# Patient Record
Sex: FEMALE | Race: WHITE | NOT HISPANIC OR LATINO | URBAN - METROPOLITAN AREA
[De-identification: names, ages, dates, MRNs, and addresses within clinical notes are randomized per-mention and may not be internally consistent; named-entity substitution may affect disease eponyms.]

---

## 2020-07-25 ENCOUNTER — TELEPHONE ENCOUNTER (OUTPATIENT)
Dept: URBAN - METROPOLITAN AREA CLINIC 13 | Facility: CLINIC | Age: 85
End: 2020-07-25

## 2020-07-25 RX ORDER — LEVOTHYROXINE SODIUM 0.1 MG/1
TAKE 1 TABLET DAILY TABLET ORAL
Refills: 0 | OUTPATIENT
End: 2015-10-22

## 2020-07-25 RX ORDER — TEMAZEPAM 15 MG/1
TAKE 1 CAPSULE AT BEDTIME AS NEEDED CAPSULE ORAL
Refills: 0 | OUTPATIENT
End: 2015-10-22

## 2020-07-26 ENCOUNTER — TELEPHONE ENCOUNTER (OUTPATIENT)
Dept: URBAN - METROPOLITAN AREA CLINIC 13 | Facility: CLINIC | Age: 85
End: 2020-07-26

## 2020-07-26 RX ORDER — RIVAROXABAN 20 MG/1
TAKE 1 TABLET DAILY TABLET, FILM COATED ORAL
Refills: 0 | Status: ACTIVE | COMMUNITY

## 2020-07-26 RX ORDER — DEXTRIN 3 G/3.5 G
TAKE AS DIRECTED POWDER (GRAM) ORAL
Refills: 0 | Status: ACTIVE | COMMUNITY

## 2020-07-26 RX ORDER — SIMVASTATIN 10 MG/1
TAKE 1 TABLET DAILY TABLET, FILM COATED ORAL
Refills: 0 | Status: ACTIVE | COMMUNITY

## 2020-07-26 RX ORDER — CALCIUM CITRATE/VITAMIN D3 315MG-6.25
TAKE 1 TABLET DAILY TABLET ORAL
Refills: 0 | Status: ACTIVE | COMMUNITY

## 2020-07-26 RX ORDER — METOPROLOL SUCCINATE 25 MG/1
TAKE TABLET  1/2 BID TABLET, EXTENDED RELEASE ORAL
Refills: 0 | Status: ACTIVE | COMMUNITY

## 2020-07-26 RX ORDER — OMEPRAZOLE 20 MG/1
TAKE 1 CAPSULE DAILY CAPSULE, DELAYED RELEASE ORAL
Refills: 0 | Status: ACTIVE | COMMUNITY

## 2020-07-26 RX ORDER — LEVOTHYROXINE SODIUM 0.09 MG/1
TAKE 1 TABLET DAILY TABLET ORAL
Refills: 0 | Status: ACTIVE | COMMUNITY

## 2020-07-26 RX ORDER — TEMAZEPAM 15 MG/1
TAKE 1 CAPSULE AT BEDTIME CAPSULE ORAL
Refills: 0 | Status: ACTIVE | COMMUNITY

## 2020-07-26 RX ORDER — LUBIPROSTONE 8 UG/1
TAKE 1 CAPSULE TWICE DAILY CAPSULE, GELATIN COATED ORAL
Refills: 0 | Status: ACTIVE | COMMUNITY

## 2022-09-21 ENCOUNTER — HOME HEALTH ADMISSION (OUTPATIENT)
Dept: HOME HEALTH SERVICES | Facility: HOME HEALTH | Age: 87
End: 2022-09-21
Payer: MEDICARE

## 2022-09-22 ENCOUNTER — HOME CARE VISIT (OUTPATIENT)
Dept: SCHEDULING | Facility: HOME HEALTH | Age: 87
End: 2022-09-22
Payer: MEDICARE

## 2022-09-22 VITALS
TEMPERATURE: 97.4 F | SYSTOLIC BLOOD PRESSURE: 120 MMHG | HEART RATE: 66 BPM | DIASTOLIC BLOOD PRESSURE: 78 MMHG | OXYGEN SATURATION: 97 % | RESPIRATION RATE: 18 BRPM

## 2022-09-22 PROCEDURE — 400013 HH SOC

## 2022-09-22 PROCEDURE — 1090000002 HH PPS REVENUE DEBIT

## 2022-09-22 PROCEDURE — 0221000100 HH NO PAY CLAIM PROCEDURE

## 2022-09-22 PROCEDURE — G0299 HHS/HOSPICE OF RN EA 15 MIN: HCPCS

## 2022-09-22 PROCEDURE — 1090000001 HH PPS REVENUE CREDIT

## 2022-09-22 ASSESSMENT — ENCOUNTER SYMPTOMS
DYSPNEA ACTIVITY LEVEL: AFTER AMBULATING 10 - 20 FT
PAIN LOCATION - PAIN QUALITY: ACHY

## 2022-09-22 NOTE — HOME HEALTH
[facility]. Patient is AOx3, forgetful at times, in no apparent distress upon arrival and agreeable to sn start of care home visit. Start of care packet reviewed and consents signed. SN assessment and vs completed. VSS. HRR, LCTA, BS WNL, skin assessment reveals traumatic wound to RLE. SN performed wound care, patient tolerated well. Patient requires SN services for wound care due to no willing or able caregiver available to perform dressing changes and cognitive impairement. Patient and prison nurse denies that patient had any recent falls; prison nurse states patient \"bumped her leg against something\" and that is how she obtained the traumatic wound to RLE. Patient has an established ostomy site to left abdomen, CDI, no issues to report. Patient states appetite is good and hydration is adequate. Patient reports generalized pain 4/10, relieved with rest and medication. Medications reviewed. Home safety check completed. Patient is homebound due to weakness, impaired functional mobility, risk for fall, risk for infection, patient requires use of assistive device and patient requires assistance of another person to leave home safely. SN educated patient/caregiver on disease process and management, home safety, using assistive device at all times, fall precautions, s/s of infection, taking medications as prescribed, call me first procedure, s/s to report to nurse, physician, and s/s that necessitate calling emergency personnel. Patient/caregiver repeated back and verbalized understanding, all questions answered. SN ended visit with pt AOx3, forgetful at times, in no apparent distress and agreeable to plan of care. Discharge planning ongoing until all goals met.

## 2022-09-23 PROCEDURE — 1090000002 HH PPS REVENUE DEBIT

## 2022-09-23 PROCEDURE — 1090000001 HH PPS REVENUE CREDIT

## 2022-09-24 PROCEDURE — 1090000001 HH PPS REVENUE CREDIT

## 2022-09-24 PROCEDURE — 1090000002 HH PPS REVENUE DEBIT

## 2022-09-25 PROCEDURE — 1090000002 HH PPS REVENUE DEBIT

## 2022-09-25 PROCEDURE — 1090000001 HH PPS REVENUE CREDIT

## 2022-09-26 ENCOUNTER — HOME CARE VISIT (OUTPATIENT)
Dept: SCHEDULING | Facility: HOME HEALTH | Age: 87
End: 2022-09-26
Payer: MEDICARE

## 2022-09-26 VITALS
SYSTOLIC BLOOD PRESSURE: 122 MMHG | RESPIRATION RATE: 18 BRPM | DIASTOLIC BLOOD PRESSURE: 70 MMHG | TEMPERATURE: 97.8 F | HEART RATE: 60 BPM | OXYGEN SATURATION: 95 %

## 2022-09-26 PROCEDURE — G0300 HHS/HOSPICE OF LPN EA 15 MIN: HCPCS

## 2022-09-26 PROCEDURE — 1090000001 HH PPS REVENUE CREDIT

## 2022-09-26 PROCEDURE — 1090000002 HH PPS REVENUE DEBIT

## 2022-09-26 ASSESSMENT — ENCOUNTER SYMPTOMS: HEMOPTYSIS: 0

## 2022-09-27 PROCEDURE — 1090000001 HH PPS REVENUE CREDIT

## 2022-09-27 PROCEDURE — 1090000002 HH PPS REVENUE DEBIT

## 2022-09-27 NOTE — HOME HEALTH
Patient with trauma wound to RLE  Wound Care provided per care plan. Pt tolerated well. Old dsg removed, scant ss drainage, no s/s of infection, pt denies pain  Caregiver involvement: pt lives in long-term  Medications reconciled and all medications are available in the facility  Home health supplies by type and quantity ordered/delivered this visit include: n/a  Patient education provided this visit: SN educated patient and patient's caregiver on infection prevention.  Patient and patient caregiver verbalizes understanding via the teach back method.    Progress toward goals: progressing well  Home exercise program: facility program  Plan for next visit: wound care  The following discharge planning was discussed with the patient/ patient's caregiver: DC when goals met and wound is healed

## 2022-09-28 PROCEDURE — 1090000002 HH PPS REVENUE DEBIT

## 2022-09-28 PROCEDURE — 1090000001 HH PPS REVENUE CREDIT

## 2022-09-29 PROCEDURE — 1090000002 HH PPS REVENUE DEBIT

## 2022-09-29 PROCEDURE — 1090000001 HH PPS REVENUE CREDIT

## 2022-09-30 ENCOUNTER — HOME CARE VISIT (OUTPATIENT)
Dept: SCHEDULING | Facility: HOME HEALTH | Age: 87
End: 2022-09-30
Payer: MEDICARE

## 2022-09-30 PROCEDURE — 1090000001 HH PPS REVENUE CREDIT

## 2022-09-30 PROCEDURE — 1090000002 HH PPS REVENUE DEBIT

## 2022-09-30 PROCEDURE — G0300 HHS/HOSPICE OF LPN EA 15 MIN: HCPCS

## 2022-10-01 PROCEDURE — 1090000002 HH PPS REVENUE DEBIT

## 2022-10-01 PROCEDURE — 1090000001 HH PPS REVENUE CREDIT

## 2022-10-02 VITALS
SYSTOLIC BLOOD PRESSURE: 112 MMHG | OXYGEN SATURATION: 93 % | HEART RATE: 60 BPM | RESPIRATION RATE: 18 BRPM | TEMPERATURE: 98 F | DIASTOLIC BLOOD PRESSURE: 70 MMHG

## 2022-10-02 PROCEDURE — 1090000001 HH PPS REVENUE CREDIT

## 2022-10-02 PROCEDURE — 1090000002 HH PPS REVENUE DEBIT

## 2022-10-02 ASSESSMENT — ENCOUNTER SYMPTOMS: HEMOPTYSIS: 0

## 2022-10-03 ENCOUNTER — HOME CARE VISIT (OUTPATIENT)
Dept: SCHEDULING | Facility: HOME HEALTH | Age: 87
End: 2022-10-03
Payer: MEDICARE

## 2022-10-03 VITALS
RESPIRATION RATE: 18 BRPM | HEART RATE: 68 BPM | DIASTOLIC BLOOD PRESSURE: 64 MMHG | TEMPERATURE: 98 F | SYSTOLIC BLOOD PRESSURE: 130 MMHG | OXYGEN SATURATION: 93 %

## 2022-10-03 PROCEDURE — 1090000001 HH PPS REVENUE CREDIT

## 2022-10-03 PROCEDURE — G0300 HHS/HOSPICE OF LPN EA 15 MIN: HCPCS

## 2022-10-03 PROCEDURE — 1090000002 HH PPS REVENUE DEBIT

## 2022-10-03 ASSESSMENT — ENCOUNTER SYMPTOMS: HEMOPTYSIS: 0

## 2022-10-03 NOTE — HOME HEALTH
Patient with trauma wound to RLE  Wound Care provided per care plan. Pt tolerated well. Old dsg removed, scant ss drainage, no s/s of infection, pt denies pain  Caregiver involvement: pt lives in [facility]  Medications reconciled and all medications are available in the facility. Home health supplies by type and quantity ordered/delivered this visit include: n/a  Patient education provided this visit: SN educated patient and patient's caregiver on infection prevention.  Patient and patient caregiver verbalizes understanding via the teach back method. Progress toward goals: progressing well  Home exercise program: facility program  Plan for next visit: wound care  The following discharge planning was discussed with the patient/ patient's caregiver: DC when goals met and wound is healed.

## 2022-10-04 PROCEDURE — 1090000001 HH PPS REVENUE CREDIT

## 2022-10-04 PROCEDURE — 1090000002 HH PPS REVENUE DEBIT

## 2022-10-04 NOTE — HOME HEALTH
Patient with trauma wound to RLE  Wound Care provided per care plan. Pt tolerated well. Old dsg removed, scant ss drainage, no s/s of infection, pt denies pain  Caregiver involvement: pt lives in custodial  Medications reconciled and all medications are available in the facility. Home health supplies by type and quantity ordered/delivered this visit include: n/a  Patient education provided this visit: SN educated patient and patient's caregiver on  infection prevention.  Patient and patient caregiver verbalizes understanding via the teach back method.    Progress toward goals: progressing well  Home exercise program: PT  Plan for next visit: wound care  The following discharge planning was discussed with the patient/ patient's caregiver: DC when goals met and wound is healed

## 2022-10-05 PROCEDURE — 1090000001 HH PPS REVENUE CREDIT

## 2022-10-05 PROCEDURE — 1090000002 HH PPS REVENUE DEBIT

## 2022-10-06 ENCOUNTER — HOME CARE VISIT (OUTPATIENT)
Dept: SCHEDULING | Facility: HOME HEALTH | Age: 87
End: 2022-10-06
Payer: MEDICARE

## 2022-10-06 VITALS
HEART RATE: 72 BPM | SYSTOLIC BLOOD PRESSURE: 128 MMHG | OXYGEN SATURATION: 96 % | TEMPERATURE: 98 F | DIASTOLIC BLOOD PRESSURE: 66 MMHG | RESPIRATION RATE: 18 BRPM

## 2022-10-06 PROCEDURE — G0300 HHS/HOSPICE OF LPN EA 15 MIN: HCPCS

## 2022-10-06 PROCEDURE — 1090000002 HH PPS REVENUE DEBIT

## 2022-10-06 PROCEDURE — 1090000001 HH PPS REVENUE CREDIT

## 2022-10-06 ASSESSMENT — ENCOUNTER SYMPTOMS: HEMOPTYSIS: 0

## 2022-10-07 PROCEDURE — 1090000002 HH PPS REVENUE DEBIT

## 2022-10-07 PROCEDURE — 1090000001 HH PPS REVENUE CREDIT

## 2022-10-07 NOTE — HOME HEALTH
Patient with wound to RLE  Wound Care provided per care plan. Pt tolerated well. Old dsg removes, scant ss drainage, no s/s of infection, pt denies pain  Caregiver involvement: pt lives in care home  Medications reconciled and all medications are available in the facility  Home health supplies by type and quantity ordered/delivered this visit include: none  Patient education provided this visit: SN educated patient and patient's caregiver on infection prevention.  Patient and patient caregiver verbalizes understanding via the teach back method.    Progress toward goals: progressing well  Home exercise program: n/a  Plan for next visit: wound care  The following discharge planning was discussed with the patient/ patient's caregiver: DC when goals met and wound is healed

## 2022-10-08 PROCEDURE — 1090000002 HH PPS REVENUE DEBIT

## 2022-10-08 PROCEDURE — 1090000001 HH PPS REVENUE CREDIT

## 2022-10-09 PROCEDURE — 1090000001 HH PPS REVENUE CREDIT

## 2022-10-09 PROCEDURE — 1090000002 HH PPS REVENUE DEBIT

## 2022-10-10 PROCEDURE — 1090000001 HH PPS REVENUE CREDIT

## 2022-10-10 PROCEDURE — 1090000002 HH PPS REVENUE DEBIT

## 2022-10-11 PROCEDURE — 1090000002 HH PPS REVENUE DEBIT

## 2022-10-11 PROCEDURE — 1090000001 HH PPS REVENUE CREDIT

## 2022-10-12 ENCOUNTER — HOME CARE VISIT (OUTPATIENT)
Dept: SCHEDULING | Facility: HOME HEALTH | Age: 87
End: 2022-10-12
Payer: MEDICARE

## 2022-10-12 VITALS
OXYGEN SATURATION: 94 % | RESPIRATION RATE: 18 BRPM | SYSTOLIC BLOOD PRESSURE: 130 MMHG | DIASTOLIC BLOOD PRESSURE: 78 MMHG | HEART RATE: 73 BPM | TEMPERATURE: 98.2 F

## 2022-10-12 PROCEDURE — 1090000002 HH PPS REVENUE DEBIT

## 2022-10-12 PROCEDURE — 1090000001 HH PPS REVENUE CREDIT

## 2022-10-12 PROCEDURE — G0300 HHS/HOSPICE OF LPN EA 15 MIN: HCPCS

## 2022-10-12 ASSESSMENT — ENCOUNTER SYMPTOMS: HEMOPTYSIS: 0

## 2022-10-13 PROCEDURE — 1090000002 HH PPS REVENUE DEBIT

## 2022-10-13 PROCEDURE — 1090000001 HH PPS REVENUE CREDIT

## 2022-10-13 NOTE — HOME HEALTH
Patient with RLE trauma wound  Wound Care provided per care plan. Pt tolerated well. Old dsg removed, scant ss drainage, no s/s of infection, pt denies pain  Caregiver involvement: pt lives in custodial  Medications reconciled and all medications are available in the facility  Home health supplies by type and quantity ordered/delivered this visit include: none  Patient education provided this visit: SN educated patient and patient's caregiver on infection prevention  Patient and patient caregiver verbalizes understanding via the teach back method.    Progress toward goals: progressing well  Home exercise program: n/a  Plan for next visit: wound care  The following discharge planning was discussed with the patient/ patient's caregiver: DC when goals met and wound is healed

## 2022-10-14 ENCOUNTER — HOME CARE VISIT (OUTPATIENT)
Dept: SCHEDULING | Facility: HOME HEALTH | Age: 87
End: 2022-10-14
Payer: MEDICARE

## 2022-10-14 PROCEDURE — 1090000001 HH PPS REVENUE CREDIT

## 2022-10-14 PROCEDURE — G0300 HHS/HOSPICE OF LPN EA 15 MIN: HCPCS

## 2022-10-14 PROCEDURE — 1090000002 HH PPS REVENUE DEBIT

## 2022-10-15 PROCEDURE — 1090000001 HH PPS REVENUE CREDIT

## 2022-10-15 PROCEDURE — 1090000002 HH PPS REVENUE DEBIT

## 2022-10-16 VITALS
HEART RATE: 66 BPM | TEMPERATURE: 98 F | OXYGEN SATURATION: 97 % | SYSTOLIC BLOOD PRESSURE: 138 MMHG | DIASTOLIC BLOOD PRESSURE: 66 MMHG | RESPIRATION RATE: 18 BRPM

## 2022-10-16 PROCEDURE — 1090000002 HH PPS REVENUE DEBIT

## 2022-10-16 PROCEDURE — 1090000001 HH PPS REVENUE CREDIT

## 2022-10-16 ASSESSMENT — ENCOUNTER SYMPTOMS: HEMOPTYSIS: 0

## 2022-10-17 ENCOUNTER — HOME CARE VISIT (OUTPATIENT)
Dept: SCHEDULING | Facility: HOME HEALTH | Age: 87
End: 2022-10-17
Payer: MEDICARE

## 2022-10-17 VITALS
RESPIRATION RATE: 18 BRPM | TEMPERATURE: 98.2 F | SYSTOLIC BLOOD PRESSURE: 116 MMHG | DIASTOLIC BLOOD PRESSURE: 64 MMHG | OXYGEN SATURATION: 96 % | HEART RATE: 68 BPM

## 2022-10-17 PROCEDURE — G0300 HHS/HOSPICE OF LPN EA 15 MIN: HCPCS

## 2022-10-17 PROCEDURE — 1090000001 HH PPS REVENUE CREDIT

## 2022-10-17 PROCEDURE — 1090000002 HH PPS REVENUE DEBIT

## 2022-10-17 ASSESSMENT — ENCOUNTER SYMPTOMS: HEMOPTYSIS: 0

## 2022-10-17 NOTE — HOME HEALTH
Patient with RLE trauma wound  Wound Care provided per care plan. Pt tolerated well. Old dsg removed, scant ss drainage, no s/s of infection, pt denies pain  Caregiver involvement: pt lives in residential  Medications reconciled and all medications are available in the facility  Home health supplies by type and quantity ordered/delivered this visit include: none  Patient education provided this visit: SN educated patient and patient's caregiver on infection prevention.  Patient and patient caregiver verbalizes understanding via the teach back method.    Progress toward goals: progressing well  Home exercise program: n/a  Plan for next visit: wound care  The following discharge planning was discussed with the patient/ patient's caregiver: DC when goals met and wound is healed

## 2022-10-18 PROCEDURE — 1090000001 HH PPS REVENUE CREDIT

## 2022-10-18 PROCEDURE — 1090000002 HH PPS REVENUE DEBIT

## 2022-10-18 NOTE — HOME HEALTH
Patient with RLE trauma wound  Wound Care provided per care plan. Pt tolerated well. Old dsg removed, scant ss drainage, no s/s of infection, pt denies pain  Caregiver involvement: pt lives in [facility]  Medications reconciled and all medications are available in the facility. Home health supplies by type and quantity ordered/delivered this visit include: none  Patient education provided this visit: SN educated patient and patient's caregiver on infection prevention.  Patient and patient caregiver verbalizes understanding via the teach back method.    Progress toward goals: progressing well  Home exercise program: n/a  Plan for next visit: wound care  The following discharge planning was discussed with the patient/ patient's caregiver: DC when goals met and wound is healed

## 2022-10-19 PROCEDURE — 1090000002 HH PPS REVENUE DEBIT

## 2022-10-19 PROCEDURE — 1090000001 HH PPS REVENUE CREDIT

## 2022-10-20 ENCOUNTER — HOME CARE VISIT (OUTPATIENT)
Dept: SCHEDULING | Facility: HOME HEALTH | Age: 87
End: 2022-10-20
Payer: MEDICARE

## 2022-10-20 VITALS
TEMPERATURE: 97.2 F | OXYGEN SATURATION: 96 % | DIASTOLIC BLOOD PRESSURE: 70 MMHG | SYSTOLIC BLOOD PRESSURE: 126 MMHG | RESPIRATION RATE: 18 BRPM | HEART RATE: 66 BPM

## 2022-10-20 PROCEDURE — 1090000001 HH PPS REVENUE CREDIT

## 2022-10-20 PROCEDURE — G0299 HHS/HOSPICE OF RN EA 15 MIN: HCPCS

## 2022-10-20 PROCEDURE — 1090000002 HH PPS REVENUE DEBIT

## 2022-10-20 NOTE — HOME HEALTH
SN greeted patient in [facility] where she resides. Patient is AOx3, forgetful at times, in no apparent distress upon arrival and agreeable to sn home visit for reassessment. SN assessment and vs completed. VSS. HRR, LCTA, BS WNL, skin assessment reveals traumatic wound to RLE. SN performed wound care, patient tolerated well. Patient requires SN services for wound care due to no willing or able caregiver available to perform dressing changes and cognitive impairement. Patient has an established ostomy site to left abdomen, CDI, no issues to report. Patient states appetite is good and hydration is adequate. Medications reviewed, no changes to report. Patient is homebound due to weakness, impaired functional mobility, risk for fall, risk for infection, patient requires use of assistive device and patient requires assistance of another person to leave home safely. SN educated patient/caregiver on disease process and management, home safety, using assistive device at all times, fall precautions, s/s of infection, taking medications as prescribed, call me first procedure, s/s to report to nurse, physician, and s/s that necessitate calling emergency personnel. Patient/caregiver repeated back and verbalized understanding, all questions answered. SN ended visit with pt AOx3, forgetful at times, in no apparent distress and agreeable to plan of care. Discharge planning ongoing until all goals met.

## 2022-10-21 PROCEDURE — 1090000002 HH PPS REVENUE DEBIT

## 2022-10-21 PROCEDURE — 1090000001 HH PPS REVENUE CREDIT

## 2022-10-22 PROCEDURE — 1090000002 HH PPS REVENUE DEBIT

## 2022-10-22 PROCEDURE — 1090000001 HH PPS REVENUE CREDIT

## 2022-10-23 PROCEDURE — 1090000002 HH PPS REVENUE DEBIT

## 2022-10-23 PROCEDURE — 1090000001 HH PPS REVENUE CREDIT

## 2022-10-24 PROCEDURE — 1090000001 HH PPS REVENUE CREDIT

## 2022-10-24 PROCEDURE — 1090000002 HH PPS REVENUE DEBIT

## 2022-10-25 PROCEDURE — 1090000002 HH PPS REVENUE DEBIT

## 2022-10-25 PROCEDURE — 1090000001 HH PPS REVENUE CREDIT

## 2022-10-26 ENCOUNTER — HOME CARE VISIT (OUTPATIENT)
Dept: SCHEDULING | Facility: HOME HEALTH | Age: 87
End: 2022-10-26
Payer: MEDICARE

## 2022-10-26 PROCEDURE — 400013 HH SOC

## 2022-10-26 PROCEDURE — G0300 HHS/HOSPICE OF LPN EA 15 MIN: HCPCS

## 2022-10-26 PROCEDURE — 1090000001 HH PPS REVENUE CREDIT

## 2022-10-26 PROCEDURE — 1090000002 HH PPS REVENUE DEBIT

## 2022-10-27 VITALS
DIASTOLIC BLOOD PRESSURE: 68 MMHG | RESPIRATION RATE: 18 BRPM | OXYGEN SATURATION: 94 % | HEART RATE: 74 BPM | SYSTOLIC BLOOD PRESSURE: 116 MMHG | TEMPERATURE: 98 F

## 2022-10-27 PROCEDURE — 1090000001 HH PPS REVENUE CREDIT

## 2022-10-27 PROCEDURE — 1090000002 HH PPS REVENUE DEBIT

## 2022-10-27 ASSESSMENT — ENCOUNTER SYMPTOMS: HEMOPTYSIS: 0

## 2022-10-27 NOTE — HOME HEALTH
Patient with RLE trauma wound  Wound Care provided per care plan. Pt tolerated well. Old dsg removed, scant ss drainage, no s/s of infection, no c/o pain  Caregiver involvement: pt lives in [place]  Medications reconciled and all medications are available in the facility  Home health supplies by type and quantity ordered/delivered this visit include:   Patient education provided this visit: SN educated patient and patient's caregiver on infection prevention  Patient and patient caregiver verbalizes understanding via the teach back method. Progress toward goals: progressing well  Home exercise program: n/a  Plan for next visit: wound care  The following discharge planning was discussed with the patient/ patient's caregiver: DC when goals met and wound is healed.

## 2022-10-28 ENCOUNTER — HOME CARE VISIT (OUTPATIENT)
Dept: SCHEDULING | Facility: HOME HEALTH | Age: 87
End: 2022-10-28
Payer: MEDICARE

## 2022-10-28 PROCEDURE — G0300 HHS/HOSPICE OF LPN EA 15 MIN: HCPCS

## 2022-10-28 PROCEDURE — 1090000001 HH PPS REVENUE CREDIT

## 2022-10-28 PROCEDURE — 1090000002 HH PPS REVENUE DEBIT

## 2022-10-29 VITALS
SYSTOLIC BLOOD PRESSURE: 118 MMHG | DIASTOLIC BLOOD PRESSURE: 62 MMHG | HEART RATE: 68 BPM | RESPIRATION RATE: 18 BRPM | OXYGEN SATURATION: 94 % | TEMPERATURE: 97.7 F

## 2022-10-29 PROCEDURE — 1090000001 HH PPS REVENUE CREDIT

## 2022-10-29 PROCEDURE — 1090000002 HH PPS REVENUE DEBIT

## 2022-10-29 ASSESSMENT — ENCOUNTER SYMPTOMS: HEMOPTYSIS: 0

## 2022-10-30 PROCEDURE — 1090000001 HH PPS REVENUE CREDIT

## 2022-10-30 PROCEDURE — 1090000002 HH PPS REVENUE DEBIT

## 2022-10-30 NOTE — HOME HEALTH
Patient with RLE trauma wound  Wound Care provided per care plan. Pt tolerated well. Old dsg removed, no drainage, no s/s of infection, pt denies pain  Caregiver involvement: pt lives in [place]  Medications reconciled and all medications are available in the facility. Home health supplies by type and quantity ordered/delivered this visit include: none  Patient education provided this visit: SN educated patient and patient's caregiver on infection prevention.  Patient and patient caregiver verbalizes understanding via the teach back method.    Progress toward goals: progressing well  Home exercise program: n/a  Plan for next visit: wound care  The following discharge planning was discussed with the patient/ patient's caregiver: DC when goals met and wound is healed

## 2022-10-31 ENCOUNTER — HOME CARE VISIT (OUTPATIENT)
Dept: SCHEDULING | Facility: HOME HEALTH | Age: 87
End: 2022-10-31
Payer: MEDICARE

## 2022-10-31 VITALS
HEART RATE: 77 BPM | RESPIRATION RATE: 18 BRPM | DIASTOLIC BLOOD PRESSURE: 64 MMHG | OXYGEN SATURATION: 99 % | SYSTOLIC BLOOD PRESSURE: 132 MMHG | TEMPERATURE: 97.5 F

## 2022-10-31 PROCEDURE — 1090000001 HH PPS REVENUE CREDIT

## 2022-10-31 PROCEDURE — 1090000002 HH PPS REVENUE DEBIT

## 2022-10-31 PROCEDURE — G0300 HHS/HOSPICE OF LPN EA 15 MIN: HCPCS

## 2022-10-31 ASSESSMENT — ENCOUNTER SYMPTOMS: HEMOPTYSIS: 0

## 2022-11-01 PROCEDURE — 1090000002 HH PPS REVENUE DEBIT

## 2022-11-01 PROCEDURE — 1090000001 HH PPS REVENUE CREDIT

## 2022-11-01 NOTE — HOME HEALTH
Patient with RLE trauma wound  Wound Care provided per care plan. Pt tolerated well. Old dsg removed, no drainage, no s/s of infection, wound is closed, covered for protection  Caregiver involvement: pt lives in [place]  Medications reconciled and all medications are available in the facility  Home health supplies by type and quantity ordered/delivered this visit include: none  Patient education provided this visit: SN educated patient and patient's caregiver on infection prevention.  Patient and patient caregiver verbalizes understanding via the teach back method.    Progress toward goals: progressing well  Home exercise program: n/a  The following discharge planning was discussed with the patient/ patient's caregiver: DC when goals met

## 2022-11-02 ENCOUNTER — HOME CARE VISIT (OUTPATIENT)
Dept: SCHEDULING | Facility: HOME HEALTH | Age: 87
End: 2022-11-02
Payer: MEDICARE

## 2022-11-02 VITALS
RESPIRATION RATE: 18 BRPM | OXYGEN SATURATION: 96 % | SYSTOLIC BLOOD PRESSURE: 130 MMHG | HEART RATE: 72 BPM | TEMPERATURE: 97.4 F | DIASTOLIC BLOOD PRESSURE: 68 MMHG

## 2022-11-02 PROCEDURE — 1090000002 HH PPS REVENUE DEBIT

## 2022-11-02 PROCEDURE — G0299 HHS/HOSPICE OF RN EA 15 MIN: HCPCS

## 2022-11-02 PROCEDURE — 1090000001 HH PPS REVENUE CREDIT

## 2022-11-02 ASSESSMENT — ENCOUNTER SYMPTOMS: DYSPNEA ACTIVITY LEVEL: AFTER AMBULATING MORE THAN 20 FT

## 2022-11-02 NOTE — HOME HEALTH
SN greeted patient in [facility] where she resides. Patient is AOx3, forgetful at times, in no apparent distress upon arrival and agreeable to sn home visit for discharge from agency. SN assessment and vs completed. VSS. HRR, LCTA, BS WNL, skin is intact. Patient has an established ostomy site to left abdomen, CDI, no issues to report. Patient states appetite is good and hydration is adequate. Medications reviewed, no changes to report. Patient is homebound due to weakness, impaired functional mobility, risk for fall, risk for infection, patient requires use of assistive device and patient requires assistance of another person to leave home safely. SN educated patient/caregiver on disease process and management, home safety, using assistive device at all times, fall precautions, s/s of infection, taking medications as prescribed, s/s to report to nurse, physician, and s/s that necessitate calling emergency personnel. Patient/caregiver repeated back and verbalized understanding, all questions answered. SN ended visit with pt AOx3, forgetful at times, in no apparent distress and agreeable to plan of care. Patient is discharged from home health agency due to wound is healed and goals are met.

## 2023-06-14 ENCOUNTER — HOME HEALTH ADMISSION (OUTPATIENT)
Dept: HOME HEALTH SERVICES | Facility: HOME HEALTH | Age: 88
End: 2023-06-14
Payer: MEDICARE

## 2023-06-15 PROCEDURE — 0221000100 HH NO PAY CLAIM PROCEDURE

## 2023-06-20 ENCOUNTER — HOME CARE VISIT (OUTPATIENT)
Dept: SCHEDULING | Facility: HOME HEALTH | Age: 88
End: 2023-06-20

## 2023-06-20 VITALS
HEART RATE: 68 BPM | DIASTOLIC BLOOD PRESSURE: 89 MMHG | RESPIRATION RATE: 18 BRPM | TEMPERATURE: 97.5 F | OXYGEN SATURATION: 96 % | SYSTOLIC BLOOD PRESSURE: 129 MMHG

## 2023-06-20 PROCEDURE — G0152 HHCP-SERV OF OT,EA 15 MIN: HCPCS

## 2023-06-21 ENCOUNTER — HOME CARE VISIT (OUTPATIENT)
Dept: SCHEDULING | Facility: HOME HEALTH | Age: 88
End: 2023-06-21

## 2023-06-21 VITALS
HEART RATE: 64 BPM | TEMPERATURE: 97.8 F | OXYGEN SATURATION: 97 % | DIASTOLIC BLOOD PRESSURE: 70 MMHG | SYSTOLIC BLOOD PRESSURE: 118 MMHG | RESPIRATION RATE: 18 BRPM

## 2023-06-21 PROCEDURE — G0300 HHS/HOSPICE OF LPN EA 15 MIN: HCPCS

## 2023-06-21 PROCEDURE — G0157 HHC PT ASSISTANT EA 15: HCPCS

## 2023-06-21 ASSESSMENT — ENCOUNTER SYMPTOMS
HEMOPTYSIS: 0
HEMOPTYSIS: 0

## 2023-06-22 ENCOUNTER — HOME CARE VISIT (OUTPATIENT)
Dept: HOME HEALTH SERVICES | Facility: HOME HEALTH | Age: 88
End: 2023-06-22

## 2023-06-22 PROCEDURE — G0152 HHCP-SERV OF OT,EA 15 MIN: HCPCS

## 2023-06-22 NOTE — HOME HEALTH
SN assessment, vss, condition stable, respirations even and unlabored, no sob, lcta, ambulates with walker with assistance x1, no falls reported, colostomy bag intact, managed by facility nurse, no urinary problems, good appetite, fair hydration, no open areas noted, no c/o pain, no distress noted, sn instructed staff member on safety precautions, proper nutrition and hydration, monitor and report s/s of infection, cg voiced understanding using the teach back method.

## 2023-06-23 ENCOUNTER — HOME CARE VISIT (OUTPATIENT)
Dept: SCHEDULING | Facility: HOME HEALTH | Age: 88
End: 2023-06-23

## 2023-06-23 PROCEDURE — G0157 HHC PT ASSISTANT EA 15: HCPCS

## 2023-06-25 ENCOUNTER — HOME CARE VISIT (OUTPATIENT)
Dept: SCHEDULING | Facility: HOME HEALTH | Age: 88
End: 2023-06-25

## 2023-06-25 VITALS
OXYGEN SATURATION: 97 % | DIASTOLIC BLOOD PRESSURE: 83 MMHG | SYSTOLIC BLOOD PRESSURE: 113 MMHG | HEART RATE: 62 BPM | TEMPERATURE: 97.3 F | RESPIRATION RATE: 18 BRPM

## 2023-06-25 VITALS
TEMPERATURE: 97.2 F | OXYGEN SATURATION: 92 % | DIASTOLIC BLOOD PRESSURE: 80 MMHG | DIASTOLIC BLOOD PRESSURE: 76 MMHG | SYSTOLIC BLOOD PRESSURE: 122 MMHG | HEART RATE: 71 BPM | SYSTOLIC BLOOD PRESSURE: 118 MMHG | OXYGEN SATURATION: 96 % | HEART RATE: 67 BPM

## 2023-06-25 PROCEDURE — G0157 HHC PT ASSISTANT EA 15: HCPCS

## 2023-06-25 ASSESSMENT — ENCOUNTER SYMPTOMS: HEMOPTYSIS: 0

## 2023-06-26 ENCOUNTER — HOME CARE VISIT (OUTPATIENT)
Dept: SCHEDULING | Facility: HOME HEALTH | Age: 88
End: 2023-06-26

## 2023-06-26 VITALS
HEART RATE: 100 BPM | OXYGEN SATURATION: 94 % | TEMPERATURE: 97.6 F | DIASTOLIC BLOOD PRESSURE: 68 MMHG | RESPIRATION RATE: 18 BRPM | SYSTOLIC BLOOD PRESSURE: 128 MMHG

## 2023-06-26 PROCEDURE — G0299 HHS/HOSPICE OF RN EA 15 MIN: HCPCS

## 2023-06-27 ENCOUNTER — HOME CARE VISIT (OUTPATIENT)
Dept: SCHEDULING | Facility: HOME HEALTH | Age: 88
End: 2023-06-27

## 2023-06-27 VITALS
TEMPERATURE: 97.4 F | RESPIRATION RATE: 18 BRPM | SYSTOLIC BLOOD PRESSURE: 131 MMHG | OXYGEN SATURATION: 97 % | DIASTOLIC BLOOD PRESSURE: 81 MMHG | HEART RATE: 78 BPM

## 2023-06-27 PROCEDURE — G0152 HHCP-SERV OF OT,EA 15 MIN: HCPCS

## 2023-06-27 ASSESSMENT — ENCOUNTER SYMPTOMS: HEMOPTYSIS: 0

## 2023-06-28 ENCOUNTER — HOME CARE VISIT (OUTPATIENT)
Dept: HOME HEALTH SERVICES | Facility: HOME HEALTH | Age: 88
End: 2023-06-28

## 2023-06-28 VITALS
HEART RATE: 61 BPM | OXYGEN SATURATION: 96 % | SYSTOLIC BLOOD PRESSURE: 140 MMHG | DIASTOLIC BLOOD PRESSURE: 85 MMHG | RESPIRATION RATE: 14 BRPM | TEMPERATURE: 96.8 F

## 2023-06-28 PROCEDURE — G0151 HHCP-SERV OF PT,EA 15 MIN: HCPCS

## 2023-06-29 ENCOUNTER — HOME CARE VISIT (OUTPATIENT)
Dept: SCHEDULING | Facility: HOME HEALTH | Age: 88
End: 2023-06-29

## 2023-06-29 VITALS — SYSTOLIC BLOOD PRESSURE: 122 MMHG | HEART RATE: 80 BPM | DIASTOLIC BLOOD PRESSURE: 78 MMHG

## 2023-06-29 PROCEDURE — G0300 HHS/HOSPICE OF LPN EA 15 MIN: HCPCS

## 2023-06-29 PROCEDURE — G0152 HHCP-SERV OF OT,EA 15 MIN: HCPCS

## 2023-06-29 ASSESSMENT — ENCOUNTER SYMPTOMS: HEMOPTYSIS: 0

## 2023-07-01 VITALS
SYSTOLIC BLOOD PRESSURE: 112 MMHG | OXYGEN SATURATION: 96 % | HEART RATE: 60 BPM | DIASTOLIC BLOOD PRESSURE: 64 MMHG | RESPIRATION RATE: 18 BRPM | TEMPERATURE: 97.9 F

## 2023-07-01 ASSESSMENT — ENCOUNTER SYMPTOMS: HEMOPTYSIS: 0

## 2023-07-03 ENCOUNTER — HOME CARE VISIT (OUTPATIENT)
Dept: SCHEDULING | Facility: HOME HEALTH | Age: 88
End: 2023-07-03

## 2023-07-03 ENCOUNTER — HOME CARE VISIT (OUTPATIENT)
Dept: HOME HEALTH SERVICES | Facility: HOME HEALTH | Age: 88
End: 2023-07-03

## 2023-07-03 VITALS
RESPIRATION RATE: 18 BRPM | HEART RATE: 68 BPM | TEMPERATURE: 98 F | OXYGEN SATURATION: 96 % | SYSTOLIC BLOOD PRESSURE: 116 MMHG | DIASTOLIC BLOOD PRESSURE: 62 MMHG

## 2023-07-03 PROCEDURE — G0152 HHCP-SERV OF OT,EA 15 MIN: HCPCS

## 2023-07-03 PROCEDURE — G0300 HHS/HOSPICE OF LPN EA 15 MIN: HCPCS

## 2023-07-03 ASSESSMENT — ENCOUNTER SYMPTOMS: HEMOPTYSIS: 0

## 2023-07-05 ENCOUNTER — HOME CARE VISIT (OUTPATIENT)
Dept: HOME HEALTH SERVICES | Facility: HOME HEALTH | Age: 88
End: 2023-07-05

## 2023-07-05 PROCEDURE — G0152 HHCP-SERV OF OT,EA 15 MIN: HCPCS

## 2023-07-06 ENCOUNTER — HOME CARE VISIT (OUTPATIENT)
Dept: SCHEDULING | Facility: HOME HEALTH | Age: 88
End: 2023-07-06

## 2023-07-06 VITALS
RESPIRATION RATE: 18 BRPM | OXYGEN SATURATION: 95 % | SYSTOLIC BLOOD PRESSURE: 128 MMHG | TEMPERATURE: 97.8 F | DIASTOLIC BLOOD PRESSURE: 64 MMHG | HEART RATE: 63 BPM

## 2023-07-06 PROCEDURE — G0300 HHS/HOSPICE OF LPN EA 15 MIN: HCPCS

## 2023-07-06 ASSESSMENT — ENCOUNTER SYMPTOMS: HEMOPTYSIS: 0

## 2023-07-08 VITALS
HEART RATE: 67 BPM | TEMPERATURE: 98.2 F | SYSTOLIC BLOOD PRESSURE: 128 MMHG | RESPIRATION RATE: 18 BRPM | DIASTOLIC BLOOD PRESSURE: 90 MMHG | OXYGEN SATURATION: 98 %

## 2023-07-08 VITALS
DIASTOLIC BLOOD PRESSURE: 90 MMHG | TEMPERATURE: 98.3 F | OXYGEN SATURATION: 96 % | SYSTOLIC BLOOD PRESSURE: 133 MMHG | HEART RATE: 76 BPM | RESPIRATION RATE: 19 BRPM

## 2023-07-08 ASSESSMENT — ENCOUNTER SYMPTOMS
HEMOPTYSIS: 0
HEMOPTYSIS: 0

## 2023-07-08 NOTE — HOME HEALTH
Pt completed dyn sit balance activity from edge of chair reachin across midline and outside of LUCERO with right reactions present. Tolerated  5 sets of 10-15 reps at a time. Completed FM craft activity (light bright) with encouragement and good accuracy to insert in small holes. Left pt sitting in recliner with tray in front to work on word search. cont pt POC.

## 2023-07-10 ENCOUNTER — HOME CARE VISIT (OUTPATIENT)
Dept: SCHEDULING | Facility: HOME HEALTH | Age: 88
End: 2023-07-10
Payer: MEDICARE

## 2023-07-10 VITALS
TEMPERATURE: 97.5 F | OXYGEN SATURATION: 94 % | RESPIRATION RATE: 18 BRPM | HEART RATE: 60 BPM | DIASTOLIC BLOOD PRESSURE: 64 MMHG | SYSTOLIC BLOOD PRESSURE: 124 MMHG

## 2023-07-10 PROCEDURE — G0300 HHS/HOSPICE OF LPN EA 15 MIN: HCPCS

## 2023-07-10 NOTE — HOME HEALTH
Patient with anterior RLE trauma wound  Wound Care provided per care plan. Pt tolerated well. Old dressing removed, scant drainage, no s/s of infection,  denied any pain/discomfort with procedure  Caregiver involvement: patient lives in [place]  Medications reconciled and all medications are available in the facility  Home health supplies by type and quantity ordered/delivered this visit include:   Patient education provided this visit: SN educated patient and patient's caregiver on infection prevention, keep dsg dry and clean, monitor and report increase pain, drainage, swelling. Patient and patient caregiver verbalizes understanding via the teach back method.    Progress toward goals: progressing well  Home exercise program: PT  Plan for next visit: wound care  The following discharge planning was discussed with the patient/ patient's caregiver: DC when goals met and wound is healed

## 2023-07-11 ENCOUNTER — HOME CARE VISIT (OUTPATIENT)
Dept: SCHEDULING | Facility: HOME HEALTH | Age: 88
End: 2023-07-11
Payer: MEDICARE

## 2023-07-11 PROCEDURE — G0152 HHCP-SERV OF OT,EA 15 MIN: HCPCS

## 2023-07-12 VITALS
TEMPERATURE: 97.2 F | HEART RATE: 82 BPM | SYSTOLIC BLOOD PRESSURE: 126 MMHG | OXYGEN SATURATION: 97 % | DIASTOLIC BLOOD PRESSURE: 89 MMHG | RESPIRATION RATE: 18 BRPM

## 2023-07-13 ENCOUNTER — HOME CARE VISIT (OUTPATIENT)
Dept: SCHEDULING | Facility: HOME HEALTH | Age: 88
End: 2023-07-13
Payer: MEDICARE

## 2023-07-13 ENCOUNTER — HOME CARE VISIT (OUTPATIENT)
Dept: HOME HEALTH SERVICES | Facility: HOME HEALTH | Age: 88
End: 2023-07-13
Payer: MEDICARE

## 2023-07-13 VITALS
SYSTOLIC BLOOD PRESSURE: 142 MMHG | OXYGEN SATURATION: 95 % | TEMPERATURE: 97.1 F | HEART RATE: 63 BPM | RESPIRATION RATE: 18 BRPM | DIASTOLIC BLOOD PRESSURE: 78 MMHG

## 2023-07-13 PROCEDURE — G0152 HHCP-SERV OF OT,EA 15 MIN: HCPCS

## 2023-07-13 PROCEDURE — G0299 HHS/HOSPICE OF RN EA 15 MIN: HCPCS

## 2023-07-13 NOTE — HOME HEALTH
SN greeted patient in [facility] where she resides with caregiver present. Patient is in no apparent distress upon arrival and agreeable to sn home visit for discipline discharge. SN assessment and vs completed. Vital signs stable, condition stable, skin assessment reveals traumatic wound to Right lower extremity is healed, left DES. Patient has a colostomy, intact and managed by [facility] staff. Patient denies having any pain or discomfort. Medications reviewed, no changes to report. Patient is homebound due to weakness, impaired functional mobility, risk for fall, risk for infection, patient requires use of assistive device and patient requires assistance of another person to leave home safely. SN educated caregiver on disease process and management, home safety, using assistive device at all times, fall precautions, s/s of infection, high risk medication eliquis and risk for bleeding and how to intervene if necessary, call me first procedure, s/s to report to nurse, physician, and s/s that necessitate calling emergency personnel. Patient repeated back and verbalized understanding, all questions answered. SN ended visit with patient pleasant, in no apparent distress and agreeable to discharge from nursing due to goals met.

## 2023-07-20 VITALS
RESPIRATION RATE: 18 BRPM | HEART RATE: 76 BPM | OXYGEN SATURATION: 96 % | SYSTOLIC BLOOD PRESSURE: 132 MMHG | DIASTOLIC BLOOD PRESSURE: 87 MMHG | TEMPERATURE: 97.7 F

## 2023-07-20 ASSESSMENT — ENCOUNTER SYMPTOMS
HEMOPTYSIS: 0
HEMOPTYSIS: 0

## 2023-07-21 NOTE — HOME HEALTH
Pt completed sit dyn balance activity from edge of chair with good right reactions tapping balloon from all planes 4 x 20 reps. Pt completed  addition worksheet with  90% accuracy and started word search using tray functionally positioned at recliner encouraging upright posture. Corresponded with family and they have provided additional worksheets/activities for her to engage in throughout the day.   Cont Pt POC

## 2023-07-21 NOTE — HOME HEALTH
Pt participated in skilled OT services including ther ex, ADL, safety, balance and functional transfer training. Pt made good progress toward OT stated goals and reached prior level of functioning as she is able to pivot transfer recliner to/from w/c with SBA. She is able to complete UB ADLs with set up and cues and LB ADLs with mod to Max A. Therapist corresponded with family members and provided leisure activities that are at good just right challenge including math worksheets, word searches and puzzles for her to engage in throughout the day. Pt met goals and is appropriate for DC from OT services at this time.

## 2023-09-12 ENCOUNTER — HOME HEALTH ADMISSION (OUTPATIENT)
Dept: HOME HEALTH SERVICES | Facility: HOME HEALTH | Age: 88
End: 2023-09-12
Payer: MEDICARE

## 2023-09-13 ENCOUNTER — HOME CARE VISIT (OUTPATIENT)
Dept: SCHEDULING | Facility: HOME HEALTH | Age: 88
End: 2023-09-13

## 2023-09-13 VITALS
DIASTOLIC BLOOD PRESSURE: 80 MMHG | HEART RATE: 80 BPM | TEMPERATURE: 97 F | OXYGEN SATURATION: 95 % | SYSTOLIC BLOOD PRESSURE: 140 MMHG | RESPIRATION RATE: 18 BRPM

## 2023-09-13 PROCEDURE — G0299 HHS/HOSPICE OF RN EA 15 MIN: HCPCS

## 2023-09-13 PROCEDURE — 0221000100 HH NO PAY CLAIM PROCEDURE

## 2023-09-13 ASSESSMENT — ENCOUNTER SYMPTOMS: DYSPNEA ACTIVITY LEVEL: AFTER AMBULATING 10 - 20 FT

## 2023-09-13 NOTE — HOME HEALTH
Verbal telephone consent received from Santo Rubi New Letcher prior to SN arrival. Consents to be emailed and signed by New Letcher electronically. SN greeted patient in [place] where she resides. Patient is AOx1 in no apparent distress and agreeable to visit. SN assessment completed, condition stable, lungs clear in all fields, colostomy appliance is intact, [facility] manages colostomy, no issues to report. Patient has traumatic wound to right lower extremity due to [facility] while transferring to wheelchair with granddaughter per POA. SN performed wound care to right lower extremity traumatic wound using clean technique, patient tolerated well. Patient requires SN services for wound care due to location, patient unable to reach wound to perform wound care and no willing or able caregiver available. POA declines PT/OT evals at this time. Supervisor and [facility] DON made aware and acknowledged. Medications reviewed. SN educated caregiver on disease process and management, med management, infection control, home safety, fall precautions, taking medications as prescribed, high risk medication eliquis, risk for bleeding, call me first procedure, s/s to report to nurse, physician, and s/s that necessitate calling emergency personnel. Caregiver verbalized understanding via teachback method, all questions answered. SN ended visit with patient AOx4 in no apparent distress and agreeable to POC.

## 2023-09-15 ENCOUNTER — HOME CARE VISIT (OUTPATIENT)
Dept: SCHEDULING | Facility: HOME HEALTH | Age: 88
End: 2023-09-15

## 2023-09-15 PROCEDURE — G0300 HHS/HOSPICE OF LPN EA 15 MIN: HCPCS

## 2023-09-17 VITALS
OXYGEN SATURATION: 96 % | SYSTOLIC BLOOD PRESSURE: 138 MMHG | RESPIRATION RATE: 18 BRPM | HEART RATE: 80 BPM | TEMPERATURE: 97.6 F | DIASTOLIC BLOOD PRESSURE: 78 MMHG

## 2023-09-20 ENCOUNTER — HOME CARE VISIT (OUTPATIENT)
Dept: SCHEDULING | Facility: HOME HEALTH | Age: 88
End: 2023-09-20

## 2023-09-20 PROCEDURE — G0299 HHS/HOSPICE OF RN EA 15 MIN: HCPCS

## 2023-09-21 VITALS
DIASTOLIC BLOOD PRESSURE: 74 MMHG | OXYGEN SATURATION: 96 % | TEMPERATURE: 97.5 F | RESPIRATION RATE: 16 BRPM | SYSTOLIC BLOOD PRESSURE: 132 MMHG | HEART RATE: 78 BPM

## 2023-09-21 ASSESSMENT — ENCOUNTER SYMPTOMS: DYSPNEA ACTIVITY LEVEL: AFTER AMBULATING 10 - 20 FT

## 2023-09-21 NOTE — HOME HEALTH
SN for skilled assessments and wound care management/teaching. Wound care completed as ordered in POC, tolerated well. Wound progressing in healing. No s/s of infection. Patient denies any problems with wound care and aware of s/s of infection to report to /[id number]. VS all wnl, denies any changes in medication, shob, falls, any new open wounds, problems/questions at this time. No s/s of distress during SN visit. Aware and approves of next scheduled SN visit.  Plan for next visit:  wound care, medication/disease teaching/management

## 2023-09-22 ENCOUNTER — HOME CARE VISIT (OUTPATIENT)
Dept: SCHEDULING | Facility: HOME HEALTH | Age: 88
End: 2023-09-22

## 2023-09-22 PROCEDURE — G0299 HHS/HOSPICE OF RN EA 15 MIN: HCPCS

## 2023-09-22 ASSESSMENT — ENCOUNTER SYMPTOMS: DYSPNEA ACTIVITY LEVEL: AFTER AMBULATING MORE THAN 20 FT

## 2023-09-24 VITALS
HEART RATE: 72 BPM | RESPIRATION RATE: 18 BRPM | SYSTOLIC BLOOD PRESSURE: 136 MMHG | DIASTOLIC BLOOD PRESSURE: 84 MMHG | TEMPERATURE: 96.9 F | OXYGEN SATURATION: 98 %

## 2023-09-25 ENCOUNTER — HOME CARE VISIT (OUTPATIENT)
Dept: HOME HEALTH SERVICES | Facility: HOME HEALTH | Age: 88
End: 2023-09-25

## 2023-09-27 ENCOUNTER — HOME CARE VISIT (OUTPATIENT)
Dept: SCHEDULING | Facility: HOME HEALTH | Age: 88
End: 2023-09-27

## 2023-09-27 VITALS
SYSTOLIC BLOOD PRESSURE: 102 MMHG | OXYGEN SATURATION: 92 % | DIASTOLIC BLOOD PRESSURE: 58 MMHG | TEMPERATURE: 97.3 F | RESPIRATION RATE: 16 BRPM | HEART RATE: 58 BPM

## 2023-09-27 VITALS
RESPIRATION RATE: 19 BRPM | TEMPERATURE: 97.1 F | SYSTOLIC BLOOD PRESSURE: 138 MMHG | DIASTOLIC BLOOD PRESSURE: 81 MMHG | HEART RATE: 71 BPM

## 2023-09-27 PROCEDURE — G0151 HHCP-SERV OF PT,EA 15 MIN: HCPCS

## 2023-09-27 PROCEDURE — G0299 HHS/HOSPICE OF RN EA 15 MIN: HCPCS

## 2023-09-27 ASSESSMENT — ENCOUNTER SYMPTOMS: PAIN LOCATION - PAIN QUALITY: ACHY

## 2023-09-28 ASSESSMENT — ENCOUNTER SYMPTOMS
DYSPNEA ACTIVITY LEVEL: AT REST
STOOL DESCRIPTION: SOFT FORMED

## 2023-09-28 NOTE — HOME HEALTH
Pt is an 81 yo female who was recently re admitted to the hospital secondary to CVA . Skilled PT intervention orders received. PMH includes: Falls, CVA    Pt lives at [facility]. Pt was using a wc for all mobility and was able to perform ADL's with assistance from facility staff. Pt was active with the facility activities prior to decline. Pt presents now showing generalized weakness, significantly impaired speech with difficulty with word search and expressive aphasia, mild R sided neglect with poor tracking to the R, impaired BLE strength scoring 3/5 affecting her ability to transfer and perform bed mobility safely without assistance, impaired dynamic balance scoring 16/28 on the Tinetti test affecting safe transfers and decreased cv endurance scoring 4/10 on the modified Clare scale showing fatigue with exertion. Pt demonstrates difficulty performing safe functional transfers including in/out of wc and bed mobility without assistance as able to do in PLOF. Pt is now requiring more assistance and increased vc's to perform all tasks. Pt will benefit from skilled PT intervention in order to improve functional deficits and establish a HEP to allow pt to perform daily functional tasks safely with reduced assistance from staff using an appropriate AD for support. Pt is homebound secondary to requiring assistance to leave her home safely, requires an AD for support due to impaired balance and hx of falls, and is at high risk for falls due to muscle weakness. PT reviewed POC, tx frequency, tx goals, safety precautions, fall prevention strategies, safe med management and energy conservation techniques.

## 2023-09-28 NOTE — HOME HEALTH
Patient seen today for MICHAEL and wound care visit  Wound Care rle Provided per care plan. Pt tolerated well. Caregiver involvement: via [place]   Medications reconciled and all medications are available in nursing station. Home health supplies by type and quantity ordered/delivered this visit include:  Xeroform and Foam dressings ordered. Patient education provided this visit: POC, Oxygen use and sat's need to be above 92%,  SN educated patient's caregiver on s/s of infection. Patient caregiver verbalizes understanding via the teach back method. Progress toward goals: progressing well  Home exercise program: PT/OT/SLP ordered at [facility]  Plan for next visit:  RLE wound care. The following discharge planning was discussed with the patient/ patient's caregiver: DC when goals met.

## 2023-09-29 ENCOUNTER — HOME CARE VISIT (OUTPATIENT)
Dept: HOME HEALTH SERVICES | Facility: HOME HEALTH | Age: 88
End: 2023-09-29
Payer: MEDICARE

## 2023-09-29 ENCOUNTER — HOME CARE VISIT (OUTPATIENT)
Dept: SCHEDULING | Facility: HOME HEALTH | Age: 88
End: 2023-09-29
Payer: MEDICARE

## 2023-09-29 PROCEDURE — G0157 HHC PT ASSISTANT EA 15: HCPCS

## 2023-09-30 ENCOUNTER — HOME CARE VISIT (OUTPATIENT)
Dept: HOME HEALTH SERVICES | Facility: HOME HEALTH | Age: 88
End: 2023-09-30
Payer: MEDICARE

## 2023-09-30 PROCEDURE — G0153 HHCP-SVS OF S/L PATH,EA 15MN: HCPCS

## 2023-10-01 VITALS — HEART RATE: 66 BPM | OXYGEN SATURATION: 94 % | DIASTOLIC BLOOD PRESSURE: 60 MMHG | SYSTOLIC BLOOD PRESSURE: 120 MMHG

## 2023-10-02 ENCOUNTER — HOME CARE VISIT (OUTPATIENT)
Dept: SCHEDULING | Facility: HOME HEALTH | Age: 88
End: 2023-10-02
Payer: MEDICARE

## 2023-10-02 PROCEDURE — G0157 HHC PT ASSISTANT EA 15: HCPCS

## 2023-10-04 VITALS
DIASTOLIC BLOOD PRESSURE: 62 MMHG | TEMPERATURE: 97.3 F | HEART RATE: 66 BPM | SYSTOLIC BLOOD PRESSURE: 110 MMHG | OXYGEN SATURATION: 96 %

## 2023-10-04 NOTE — HOME HEALTH
Pt reclined in her chair when therapist arrived; she appears tired. Talked to her [employer] who says that there is an order to have her meds crushed as she was chewing them or pocketing them. Pt continues to have difficulty communicating since returning from hospital; her words come out as \"word salad\" with difficulty answering or teaching back instruction. Pt has been seen in past and although times did not make sense or was pleasantly confused her speech has worsened as she is not forming sentences; speech therapy has evaled, Sat pt up in her recliner and assisted her with repositioning and sitting up in middle of chair. Had to reposition her several times as she starts to lean to the left side. Worked on manual stretch to BLE seated as well as PROM. Working on LE strength tx seated per HEP. Pt requires constant cueing to stay on task as she closes eyes often, as well as tactile assist to maintain form. She has been seen in past by therapist and has been able to follow instructions in past; since hospitalization she is requiring a significant increase in cues and assist. Worked on trunk control sit and reach to therapist as well as sitting upright in chair with perturbations without leaning to side. Raised lift chair and worked on sit to stand with cueing and direction of B hands on armrests to push off and practicing rocking forward for motion of sit to stand. Pt very tired today and resisting standing all the way up. She started to get agitated and wanted to lie back down. She is slow to progress since returning from hospital with suspected stroke. Have spoke to her aide about needing more assist and cueing to stand and pivot to transfer from chair to [facility]. Also encourage them to still bring her out to activities that she did prior to hospital as she needs stimulation and not stay in one position too long in recliner.  Also have talked to aide about making sure he food is cut up, soft if possible and make sure she

## 2023-10-04 NOTE — HOME HEALTH
Pt was reclined in her chair resting when therapist arrived; her daughter is present. Pt appears tired and is leaning to left side in her chair. Repositioned pt in chair so she is more in center. Daughter reports her having acute stroke and possible smaller TIA's. She is concerned that her mom isnt swallowing pills as she should, she is now chewing them and at times pocketing. Sheis also concerned about her eating if she is doing same. Let her know that speech has been ordered and they will go over things like this as well as work on her speech. Since returning from hospital she has had a noticeable change in speech and difficulty putting sensible sentences together. Sat pt up in recliner and initiated LE strength tx seated per HEP. Started with manual stretching and ROM to BLE then worked on [employer] tx. She requires 100% cueing today, vc and tactile assist to stay on task and mantain form. She continues to lean to left and required several assists to reposition in chair. Sit and reach while seated in recliner, reaching out to therapist with assist. Her daughter said she has transferring her stand pivot to get into wc. Pt has not been using her RW for room distance chair to bathroom ambulation as she did prior to hospitalization. There has been many changes in [facility] such as a reduction of staff including one of her main aides, and several residents have moved out or plan to which daughter feels has made her feel bad. Had pt drink some water while resting and discussed importance of staying hydrated. Talked to daughter about plan for strengthening to imprive transfers. WIll talk to MD this week about possible crush order for her meds and to talk to staff avout helping her make choices such as softer foods and make sure things are cut up small orsoftened. Pt has good family support including daughter and grandaughter who visit often.  WIth compliance she is expected to make some gains,

## 2023-10-05 ENCOUNTER — HOME CARE VISIT (OUTPATIENT)
Dept: HOME HEALTH SERVICES | Facility: HOME HEALTH | Age: 88
End: 2023-10-05
Payer: MEDICARE

## 2023-10-05 PROCEDURE — G0151 HHCP-SERV OF PT,EA 15 MIN: HCPCS

## 2023-10-07 ENCOUNTER — HOME CARE VISIT (OUTPATIENT)
Dept: HOME HEALTH SERVICES | Facility: HOME HEALTH | Age: 88
End: 2023-10-07
Payer: MEDICARE

## 2023-10-07 PROCEDURE — G0153 HHCP-SVS OF S/L PATH,EA 15MN: HCPCS

## 2023-10-09 VITALS — TEMPERATURE: 97.7 F | RESPIRATION RATE: 16 BRPM | SYSTOLIC BLOOD PRESSURE: 116 MMHG | DIASTOLIC BLOOD PRESSURE: 69 MMHG

## 2023-10-09 ASSESSMENT — ENCOUNTER SYMPTOMS: HEMOPTYSIS: 0

## 2023-10-09 NOTE — HOME HEALTH
comprehension. Therapist recommended use of visuals as pt's verbal expression was significantly impaired prior to most recent stroke, per daughter's report.  Daughter verbalized understanding of recommendations and plan of care and thanked therapist.

## 2023-10-10 ASSESSMENT — ENCOUNTER SYMPTOMS: DYSPNEA ACTIVITY LEVEL: AFTER AMBULATING LESS THAN 10 FT

## 2023-10-16 VITALS — TEMPERATURE: 97.8 F | DIASTOLIC BLOOD PRESSURE: 69 MMHG | SYSTOLIC BLOOD PRESSURE: 115 MMHG

## 2023-10-19 ASSESSMENT — ENCOUNTER SYMPTOMS
DYSPNEA ACTIVITY LEVEL: AFTER AMBULATING LESS THAN 10 FT
HEMOPTYSIS: 0

## 2023-10-19 NOTE — HOME HEALTH
Therapist assessed pt ability to take medications; medications now crushed due to pt tendency to chew them as automatic response to something placed in oral cavity. No difficulties noted; nursing stated that pt will now swallow medications without difficulty. No difficulties noted with regular foods or thin liquids. Pt seen for language tx. Pt greeted therapist with \"Hi\" and then jargon speech with inflection of greetings upon entry to room. Pt eating snack and began to set it aside; therapist instructed pt to continue eating using single-step directions and multi-modal cues including gestures and modeling. Give mod-max cues, pt followed single-step directions with ~65% accuracy. Therapist then instructed pt in [id number] matching task to increase understanding of visuals and work toward use of simple communication board. Pt required max multi-modal cueing to match photos given f:2 with 50% accuracy (likely chance). Her facial expressions expressed confusion re: task requirements throughout. Therapist discussed pt performance during session with staff and recommended use of short phrases and multi-modal cues when performing ADLs with pt; staff verbalized understanding. Pt to be d/c to hospice care.